# Patient Record
Sex: FEMALE | Race: WHITE | Employment: FULL TIME | URBAN - METROPOLITAN AREA
[De-identification: names, ages, dates, MRNs, and addresses within clinical notes are randomized per-mention and may not be internally consistent; named-entity substitution may affect disease eponyms.]

---

## 2018-11-08 ENCOUNTER — APPOINTMENT (OUTPATIENT)
Dept: GENERAL RADIOLOGY | Age: 43
End: 2018-11-08

## 2018-11-08 ENCOUNTER — HOSPITAL ENCOUNTER (EMERGENCY)
Age: 43
Discharge: HOME | End: 2018-11-08
Attending: FAMILY MEDICINE

## 2018-11-08 VITALS
RESPIRATION RATE: 14 BRPM | HEART RATE: 88 BPM | HEIGHT: 66 IN | WEIGHT: 142 LBS | OXYGEN SATURATION: 98 % | BODY MASS INDEX: 22.82 KG/M2 | SYSTOLIC BLOOD PRESSURE: 147 MMHG | TEMPERATURE: 98.4 F | DIASTOLIC BLOOD PRESSURE: 73 MMHG

## 2018-11-08 DIAGNOSIS — R09.1 PLEURISY WITHOUT EFFUSION: Primary | ICD-10-CM

## 2018-11-08 LAB
BACTERIA: ABNORMAL /HPF
BILIRUBIN URINE: NEGATIVE
BLOOD, URINE: NEGATIVE
CLARITY: ABNORMAL
COLOR: YELLOW
EPITHELIAL CELLS, UA: ABNORMAL /HPF
GLUCOSE URINE: NEGATIVE MG/DL
HCG(URINE) PREGNANCY TEST: NEGATIVE
KETONES, URINE: NEGATIVE MG/DL
LEUKOCYTE ESTERASE, URINE: ABNORMAL
NITRITE, URINE: NEGATIVE
PH UA: 5.5 (ref 5–9)
PROTEIN UA: NEGATIVE MG/DL
RBC UA: ABNORMAL /HPF (ref 0–2)
SPECIFIC GRAVITY UA: 1.01 (ref 1–1.03)
UROBILINOGEN, URINE: 0.2 E.U./DL
WBC UA: ABNORMAL /HPF (ref 0–5)

## 2018-11-08 PROCEDURE — 6370000000 HC RX 637 (ALT 250 FOR IP): Performed by: FAMILY MEDICINE

## 2018-11-08 PROCEDURE — 96372 THER/PROPH/DIAG INJ SC/IM: CPT

## 2018-11-08 PROCEDURE — 81001 URINALYSIS AUTO W/SCOPE: CPT

## 2018-11-08 PROCEDURE — 99283 EMERGENCY DEPT VISIT LOW MDM: CPT

## 2018-11-08 PROCEDURE — 6360000002 HC RX W HCPCS: Performed by: FAMILY MEDICINE

## 2018-11-08 PROCEDURE — 81025 URINE PREGNANCY TEST: CPT

## 2018-11-08 PROCEDURE — 71046 X-RAY EXAM CHEST 2 VIEWS: CPT

## 2018-11-08 PROCEDURE — 87088 URINE BACTERIA CULTURE: CPT

## 2018-11-08 RX ORDER — IBUPROFEN 600 MG/1
600 TABLET ORAL EVERY 8 HOURS PRN
Qty: 12 TABLET | Refills: 0 | Status: SHIPPED | OUTPATIENT
Start: 2018-11-08

## 2018-11-08 RX ORDER — AZITHROMYCIN 250 MG/1
TABLET, FILM COATED ORAL
Qty: 1 PACKET | Refills: 0 | Status: SHIPPED | OUTPATIENT
Start: 2018-11-08

## 2018-11-08 RX ORDER — CYCLOBENZAPRINE HCL 10 MG
10 TABLET ORAL ONCE
Status: COMPLETED | OUTPATIENT
Start: 2018-11-08 | End: 2018-11-08

## 2018-11-08 RX ORDER — KETOROLAC TROMETHAMINE 30 MG/ML
30 INJECTION, SOLUTION INTRAMUSCULAR; INTRAVENOUS ONCE
Status: COMPLETED | OUTPATIENT
Start: 2018-11-08 | End: 2018-11-08

## 2018-11-08 RX ADMIN — KETOROLAC TROMETHAMINE 30 MG: 30 INJECTION, SOLUTION INTRAMUSCULAR at 09:11

## 2018-11-08 RX ADMIN — CYCLOBENZAPRINE HYDROCHLORIDE 10 MG: 10 TABLET, FILM COATED ORAL at 09:10

## 2018-11-08 ASSESSMENT — PAIN DESCRIPTION - ORIENTATION: ORIENTATION: MID

## 2018-11-08 ASSESSMENT — PAIN SCALES - GENERAL
PAINLEVEL_OUTOF10: 8
PAINLEVEL_OUTOF10: 7
PAINLEVEL_OUTOF10: 8

## 2018-11-08 ASSESSMENT — PAIN DESCRIPTION - LOCATION: LOCATION: BACK

## 2018-11-08 NOTE — ED PROVIDER NOTES
Department of Emergency Medicine   ED  Provider Note  Admit Date/RoomTime: 11/8/2018  8:10 AM  ED Room: 11/11   Chief Complaint:   Back Pain (pt was sitting in her car 2 days ago, sudden onset of middle back pain denies any injury, pt states pain has been 24/7.  pt does have a hx of a back issue about 3 years ago, states she came here.)    History of Present Illness   Source of history provided by:  patient and spouse/SO. History/Exam Limitations: none. Dayanna Hess is a 37 y.o. old female who has a past medical history of:   Past Medical History:   Diagnosis Date    Asthma     Hearing loss     presents to the emergency department by private vehicle and ambulatory, for acute, aching and stabbing left sided middle thoracic spine pain without radiation, for 2 day(s) prior to arrival.  The pain was caused by no known injury. She has a history of no prior back problems. Since onset the symptoms have been constant and moderate in severity. There has been no bowel or bladder incontinence associated with the pain. There have been associated symptoms of nothing additional and denies any weakness, numbness or abdominal pain. The the pain is aggraveated by deep breathing and rotation and relieved by nothing. Mort Gina ROS   Pertinent positives and negatives are stated within HPI, all other systems reviewed and are negative. Past Surgical History:   Procedure Laterality Date    MIDDLE EAR SURGERY     Social History:  reports that she has been smoking Cigarettes. She has been smoking about 0.50 packs per day. She has never used smokeless tobacco. She reports that she does not drink alcohol or use drugs. Family History: family history is not on file. Allergies: Patient has no known allergies.     Physical Exam           ED Triage Vitals [11/08/18 0824]   BP Temp Temp Source Pulse Resp SpO2 Height Weight   (!) 147/73 98.4 °F (36.9 °C) Oral 88 14 98 % 5' 6\" (1.676 m) 142 lb (64.4 kg)      Oxygen Saturation

## 2018-11-10 LAB — URINE CULTURE, ROUTINE: NORMAL

## 2023-12-11 ENCOUNTER — TELEPHONE (OUTPATIENT)
Dept: AUDIOLOGY | Age: 48
End: 2023-12-11

## 2023-12-11 NOTE — TELEPHONE ENCOUNTER
Patient called and left a voicemail message. Returned patient message. Left a voicemail at the patient contact number.     Electronically signed by Byron Flores on 12/11/2023 at 11:28 AM

## 2023-12-11 NOTE — TELEPHONE ENCOUNTER
Patient called and left a voicemail message. Returned patient message. Left a voicemail at the patient contact number.     Electronically signed by Danial Sandhoff, AuD on 12/11/2023 at 1:37 PM

## 2023-12-12 ENCOUNTER — TELEPHONE (OUTPATIENT)
Dept: AUDIOLOGY | Age: 48
End: 2023-12-12

## 2023-12-12 NOTE — TELEPHONE ENCOUNTER
Patient called for audiology appointment, but is eligible for new hearing aids through [facility]. Will need ENT clearance, from either doctor or NP. Former patient of Dr Jazmine Hrerera.     Contact patient's wife:  Sharyn Denise:  [address] [facility]  [id number]  NPI#:  [id number]

## 2024-02-05 ENCOUNTER — OFFICE VISIT (OUTPATIENT)
Dept: ENT CLINIC | Age: 49
End: 2024-02-05

## 2024-02-05 ENCOUNTER — PROCEDURE VISIT (OUTPATIENT)
Dept: AUDIOLOGY | Age: 49
End: 2024-02-05
Payer: COMMERCIAL

## 2024-02-05 VITALS — HEIGHT: 66 IN | BODY MASS INDEX: 22.82 KG/M2 | WEIGHT: 142 LBS

## 2024-02-05 DIAGNOSIS — H90.3 SENSORINEURAL HEARING LOSS (SNHL) OF BOTH EARS: Primary | ICD-10-CM

## 2024-02-05 DIAGNOSIS — H90.A31 MIXED CONDUCTIVE AND SENSORINEURAL HEARING LOSS OF RIGHT EAR WITH RESTRICTED HEARING OF LEFT EAR: Primary | ICD-10-CM

## 2024-02-05 PROCEDURE — 92567 TYMPANOMETRY: CPT | Performed by: AUDIOLOGIST

## 2024-02-05 PROCEDURE — 92557 COMPREHENSIVE HEARING TEST: CPT | Performed by: AUDIOLOGIST

## 2024-02-05 NOTE — PROGRESS NOTES
[facility]  Dr. Dent. RAYMOND Fong. Ms.Ed.  New Consult       Patient Name:  Elizabeth Drummond  :  1975     CHIEF C/O:    Chief Complaint   Patient presents with    New Patient     Patient presents for updated hearing testing and hearing aids. Last dr provided hearing test was , been wearing hearing aids off and on since 2018.        HISTORY OBTAINED FROM:  patient    HISTORY OF PRESENT ILLNESS:       Elizabeth is a 48 y.o. year old female, here today to establish care for hearing loss and hearing aid use. 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 Was following with Dr. Cantrell prior to this. Told she has an \"overgrown bone\" on the left. Also had cartilage tympanoplasty on the right with Dr. Jacobson ([place]). Feels her hearing is getting worse and has tinnitus bilaterally. Has old hearing aids she uses occasionally. Denies any recent infections, ear pain or drainage.       Past Medical History:   Diagnosis Date    Asthma     Hearing loss      Past Surgical History:   Procedure Laterality Date    MIDDLE EAR SURGERY       No current outpatient medications on file.  Patient has no known allergies.  Social History     Tobacco Use    Smoking status: Every Day     Current packs/day: 0.50     Types: Cigarettes    Smokeless tobacco: Never   Substance Use Topics    Alcohol use: No    Drug use: No     History reviewed. No pertinent family history.    Review of Systems   Constitutional:  Negative for chills and fever.   HENT:  Negative for ear discharge and hearing loss.    Respiratory:  Negative for cough and shortness of breath.    Cardiovascular:  Negative for chest pain and palpitations.   Gastrointestinal:  Negative for vomiting.   Skin:  Negative for rash.   Allergic/Immunologic: Negative for environmental allergies.   Neurological:  Negative for dizziness and headaches.   Hematological:  Does not bruise/bleed easily.   All other systems reviewed and are negative.      Ht

## 2024-02-06 ASSESSMENT — ENCOUNTER SYMPTOMS
SHORTNESS OF BREATH: 0
VOMITING: 0
COUGH: 0

## 2024-02-06 NOTE — PROGRESS NOTES
This patient was referred for audiometric and tympanometric testing by Dr. Fong due to bilateral hearing loss. Patient was previously seen by Dr Cantrell. Patient reported she wore hearing aids previously.     Audiometry using pure tone air and bone conduction testing revealed a moderate rising to normal and sloping to moderately severe sensorineural hearing loss, in the right ear and a moderate rising to mild and sloping to severe sensorineural hearing loss, in the left ear. Reliability was good. Speech reception thresholds were in good agreement with the pure tone averages, bilaterally. Speech discrimination scores were excellent, bilaterally.    Tympanometry revealed large ear canal volume, in the right ear and normal middle ear peak pressure and hypercompliance, in the left ear.    The results were reviewed with the patient. Briefly discussed amplification options. After all information needed is collected a prior authorization will be submitted to the patient's insurance.  The patient will be contacted regarding approval or denial.      Recommendations for follow up will be made pending physician consult.      Byron Ramirez CCC-A  [facility] [id number]   Electronically signed by Byron Ramirez on 2/6/2024 at 2:19 PM

## 2024-03-18 ENCOUNTER — TELEPHONE (OUTPATIENT)
Dept: AUDIOLOGY | Age: 49
End: 2024-03-18

## 2024-03-18 NOTE — TELEPHONE ENCOUNTER
Spoke to patient regarding hearing aid approval. She reported she had an appointment for HA fitting at [facility] on Thursday. Told her that it did not say hearing aid fitting, just that approval was received. Patient reported she was told this was her hearing aid fitting appointment.

## 2024-03-21 ENCOUNTER — PROCEDURE VISIT (OUTPATIENT)
Dept: AUDIOLOGY | Age: 49
End: 2024-03-21

## 2024-03-21 DIAGNOSIS — H90.3 SENSORINEURAL HEARING LOSS, BILATERAL: Primary | ICD-10-CM

## 2024-03-21 PROCEDURE — 99024 POSTOP FOLLOW-UP VISIT: CPT | Performed by: AUDIOLOGIST

## 2024-03-29 NOTE — PROGRESS NOTES
Patient seen for ear mold impressions, with no issues.    Hearing aids ordered.  Will contact patient when they arrive in department.    Jorge Painting CCC/NIR  Audiologist  [id number]  NPI#:  [id number]

## 2024-04-11 ENCOUNTER — TELEPHONE (OUTPATIENT)
Dept: AUDIOLOGY | Age: 49
End: 2024-04-11

## 2024-04-11 NOTE — TELEPHONE ENCOUNTER
Left voice mail at patient contact number to schedule appointment for hearing aid fitting.    Jorge Painting CCC/A  Audiologist  [id number]  NPI#:  [id number]

## 2024-04-22 ENCOUNTER — PROCEDURE VISIT (OUTPATIENT)
Dept: AUDIOLOGY | Age: 49
End: 2024-04-22

## 2024-04-22 DIAGNOSIS — H90.3 SENSORINEURAL HEARING LOSS, BILATERAL: Primary | ICD-10-CM

## 2024-04-22 PROCEDURE — 99024 POSTOP FOLLOW-UP VISIT: CPT | Performed by: AUDIOLOGIST

## 2024-04-22 NOTE — PROGRESS NOTES
Fit with binaural  [facility]  hearing aids. Instructed in use and care. Gave  battery charger, warranty information and scheduled  1 week check for 4/29/24.  Made following adjustments: N/A.   Hearing aid contract/battery warning form reviewed and signed.      Hearing aids paired to phone milo.     Patient was satisfied and will follow up on above date, unless problems arise.     No charge visit today. Will bill at 30 day follow up per [place] Medicaid guidelines.     Jorge Painting CCC/NIR  Audiologist  [id number]  NPI#:  [id number]      Electronically signed by Byron Ibanez on 4/22/2024 at 3:39 PM

## 2024-05-23 ENCOUNTER — PROCEDURE VISIT (OUTPATIENT)
Dept: AUDIOLOGY | Age: 49
End: 2024-05-23

## 2024-05-23 DIAGNOSIS — H90.3 SENSORINEURAL HEARING LOSS, BILATERAL: Primary | ICD-10-CM

## 2024-05-23 NOTE — PROGRESS NOTES
Patient is being seen, in lieu of Sara Pack, that is no longer an audiologist, at [facility], effective 4/2/2024.    The patient came in for a 30 day hearing aid follow up with billing, per [place] Medicaid Guidelines.  Patient reported they are doing well with the hearing aids.   Changes to hearing aids today: N/A. Counseled patient on: supply needs/hearing aid follow-up. Patient is satisfied with the hearing aids and therefore billing to be done today per medicaid guidelines. Patient to return as needed.    oJrge Painting CCC/NIR  Audiologist  [id number]  NPI#:  [id number]      Electronically signed by Byron Ibanez on 5/23/2024 at 8:41 AM

## 2024-06-03 ENCOUNTER — PROCEDURE VISIT (OUTPATIENT)
Dept: AUDIOLOGY | Age: 49
End: 2024-06-03

## 2024-06-03 DIAGNOSIS — H90.3 SENSORINEURAL HEARING LOSS, BILATERAL: Primary | ICD-10-CM

## 2024-06-03 PROCEDURE — 99024 POSTOP FOLLOW-UP VISIT: CPT | Performed by: AUDIOLOGIST

## 2024-06-03 NOTE — PROGRESS NOTES
Patient seen for left hearing aid issues (intermittent/static and charging issues).  Sen to [facility] for repair.    Contact upon arrival back in dept:  [place]:  [phone]    Jorge Painting CCC/A  Audiologist  [id number]  NPI#:  [id number]

## 2024-06-20 ENCOUNTER — PROCEDURE VISIT (OUTPATIENT)
Dept: AUDIOLOGY | Age: 49
End: 2024-06-20

## 2024-06-20 DIAGNOSIS — H90.3 SENSORINEURAL HEARING LOSS, BILATERAL: Primary | ICD-10-CM

## 2024-06-20 PROCEDURE — 99024 POSTOP FOLLOW-UP VISIT: CPT | Performed by: AUDIOLOGIST

## 2024-06-20 NOTE — PROGRESS NOTES
Patient seen for hearing aid repair pickup (Phonak/Left).  Hearing aids programmed and synced to phone.    No other issues noted.  Patient to return PRN.    Jorge Painting CCC/A  Audiologist  [id number]  NPI#:  [id number]